# Patient Record
Sex: FEMALE | Employment: FULL TIME
[De-identification: names, ages, dates, MRNs, and addresses within clinical notes are randomized per-mention and may not be internally consistent; named-entity substitution may affect disease eponyms.]

---

## 2024-07-23 ENCOUNTER — TRANSCRIBE ORDERS (OUTPATIENT)
Dept: OTHER | Age: 49
End: 2024-07-23

## 2024-07-23 ENCOUNTER — MEDICAL CORRESPONDENCE (OUTPATIENT)
Dept: HEALTH INFORMATION MANAGEMENT | Facility: CLINIC | Age: 49
End: 2024-07-23

## 2024-07-23 DIAGNOSIS — M25.319 SHOULDER INSTABILITY: ICD-10-CM

## 2024-07-23 DIAGNOSIS — M25.512 LEFT SHOULDER PAIN: Primary | ICD-10-CM

## 2024-09-12 NOTE — TELEPHONE ENCOUNTER
Action September 11, 2024 7:29 PM MT   Action Taken Sent a request for imaging from [facility].        DIAGNOSIS:   Left shoulder pain [M25.512]  - Primary  Shoulder instability [M25.319]   APPOINTMENT DATE: 09/16/2024   NOTES STATUS DETAILS   OFFICE NOTE from referring provider Care Everywhere 07/19/2024 - Elisabeth Morrow MD - [facility]   OFFICE NOTE from other specialist Care Everywhere    MRI In process [facility]:  05/23/2024 - Left Shoulder MRI   XRAYS (IMAGES & REPORTS) In process [facility]:  08/28/2024 - RT Clavicle XRAY  08/28/2024 - RT Shoulder XRAY  08/28/2024 - Chest/Ribs XRAY  04/29/2024 - Left Shoulder XRAY  12/28/2023 - Chest XRAY

## 2024-09-16 ENCOUNTER — PRE VISIT (OUTPATIENT)
Dept: ORTHOPEDICS | Facility: CLINIC | Age: 49
End: 2024-09-16